# Patient Record
Sex: MALE | Race: WHITE | NOT HISPANIC OR LATINO | Employment: FULL TIME | URBAN - METROPOLITAN AREA
[De-identification: names, ages, dates, MRNs, and addresses within clinical notes are randomized per-mention and may not be internally consistent; named-entity substitution may affect disease eponyms.]

---

## 2020-09-20 ENCOUNTER — APPOINTMENT (OUTPATIENT)
Dept: RADIOLOGY | Facility: IMAGING CENTER | Age: 40
End: 2020-09-20
Attending: NURSE PRACTITIONER
Payer: COMMERCIAL

## 2020-09-20 ENCOUNTER — OFFICE VISIT (OUTPATIENT)
Dept: URGENT CARE | Facility: CLINIC | Age: 40
End: 2020-09-20
Payer: COMMERCIAL

## 2020-09-20 VITALS
SYSTOLIC BLOOD PRESSURE: 160 MMHG | OXYGEN SATURATION: 97 % | DIASTOLIC BLOOD PRESSURE: 100 MMHG | RESPIRATION RATE: 18 BRPM | TEMPERATURE: 96.9 F | HEART RATE: 98 BPM

## 2020-09-20 DIAGNOSIS — S99.911A INJURY OF RIGHT ANKLE, INITIAL ENCOUNTER: ICD-10-CM

## 2020-09-20 DIAGNOSIS — S93.401A SPRAIN OF RIGHT ANKLE, UNSPECIFIED LIGAMENT, INITIAL ENCOUNTER: ICD-10-CM

## 2020-09-20 PROCEDURE — 73610 X-RAY EXAM OF ANKLE: CPT | Mod: TC,RT | Performed by: NURSE PRACTITIONER

## 2020-09-20 PROCEDURE — 99203 OFFICE O/P NEW LOW 30 MIN: CPT | Performed by: NURSE PRACTITIONER

## 2020-09-20 NOTE — PROGRESS NOTES
Chief Complaint   Patient presents with   • Ankle Injury     x1day, right ankle injury after hiking       HISTORY OF PRESENT ILLNESS: Patient is a 40 y.o. male who presents to urgent care today with right ankle injury.  He was hiking yesterday afternoon when he tripped, rolling his ankle.  He immediately developed pain to the ankle and has had pain and swelling since.  Denies any numbness, tingling.  Pain exacerbated with ambulation and range of motion.  He has been using both Motrin, Tylenol, and crutches for symptom relief.  Denies previous injuries to this ankle.  Denies other areas of injury or trauma.    There are no active problems to display for this patient.      Allergies:Patient has no known allergies.    No current [facility]-ordered outpatient medications on file.     No current [facility]-ordered facility-administered medications on file.        History reviewed. No pertinent past medical history.    Social History     Tobacco Use   • Smoking status: Never Smoker   • Smokeless tobacco: Never Used   Substance Use Topics   • Alcohol use: Not on file   • Drug use: Not on file       No family status information on file.   History reviewed. No pertinent family history.    ROS:  Review of Systems   Constitutional: Negative for fever, chills, weight loss, malaise, and fatigue.   HENT: Negative for ear pain, nosebleeds, congestion, sore throat and neck pain.    Eyes: Negative for vision changes.   Neuro: Negative for headache, sensory changes, weakness, seizure, LOC.   Cardiovascular: Negative for chest pain, palpitations, orthopnea and leg swelling.   Respiratory: Negative for cough, sputum production, shortness of breath and wheezing.   Gastrointestinal: Negative for abdominal pain, nausea, vomiting or diarrhea.   Genitourinary: Negative for dysuria, urgency and frequency.  Musculoskeletal: Positive for falls, right ankle pain.  Negative for neck pain, back pain, myalgias.   Skin: Negative for rash, diaphoresis.  "    Exam:  /100 (BP Location: Left arm, Patient Position: Sitting, BP Cuff Size: Large adult)   Pulse 98   Temp 36.1 °C (96.9 °F) (Temporal)   Resp 18   SpO2 97%   General: well-nourished, well-developed male in NAD  Head: normocephalic, atraumatic  Eyes: PERRLA, no conjunctival injection, acuity grossly intact, lids normal.  Ears: normal shape and symmetry, no tenderness, no discharge. External canals are without any significant edema or erythema. Tympanic membranes are without any inflammation, no effusion. Gross auditory acuity is intact.  Nose: symmetrical without tenderness, no discharge.  Mouth/Throat: reasonable hygiene, no erythema, exudates or tonsillar enlargement.  Neck: no masses, range of motion within normal limits, no tracheal deviation. No obvious thyroid enlargement.   Lymph: no cervical adenopathy. No supraclavicular adenopathy.   Neuro: alert and oriented. Cranial nerves 1-12 grossly intact. No sensory deficit.   Cardiovascular: regular rate and rhythm. No edema.  Pulmonary: no distress. Chest is symmetrical with respiration, no wheezes, crackles, or rhonchi.   Musculoskeletal: no clubbing, appropriate muscle tone, gait is antalgic.  Right ankle: Moderate swelling throughout, bony tenderness to bilateral malleolus along with surrounding soft tissue tenderness.  Limited range of motion of ankle in all directions, distal neurovascular is intact.  Right foot and right knee are normal upon examination.  Skin: warm, dry, intact, no clubbing, no cyanosis, no rashes.   Psych: appropriate mood, affect, judgement.       DX right ankle radiology reading \"Soft tissue swelling without acute osseous abnormality.\"      Assessment/Plan:  1. Injury of right ankle, initial encounter  DX-ANKLE 3+ VIEWS RIGHT    REFERRAL TO ORTHOPEDICS   2. Sprain of right ankle, unspecified ligament, initial encounter  REFERRAL TO ORTHOPEDICS       Patient presents with right ankle injury, assessment consistent with " sprain.  X-ray per radiologist negative although I see some irregularity along the talus.  The patient is placed in a boot, instructed to be nonweightbearing, continue crutches, RICE.  OTC Motrin and Tylenol, referral to orthopedics is placed.  Supportive care, differential diagnoses, and indications for immediate follow-up discussed with patient.   Pathogenesis of diagnosis discussed including typical length and natural progression.   Instructed to return to clinic or nearest emergency department for any change in condition, further concerns, or worsening of symptoms.  Patient states understanding of the plan of care and discharge instructions.          Please note that this dictation was created using voice recognition software. I have made every reasonable attempt to correct obvious errors, but I expect that there are errors of grammar and possibly content that I did not discover before finalizing the note.      VALERIO Guerrero.